# Patient Record
Sex: FEMALE | Race: BLACK OR AFRICAN AMERICAN
[De-identification: names, ages, dates, MRNs, and addresses within clinical notes are randomized per-mention and may not be internally consistent; named-entity substitution may affect disease eponyms.]

---

## 2019-05-15 ENCOUNTER — HOSPITAL ENCOUNTER (EMERGENCY)
Dept: HOSPITAL 53 - M ED | Age: 24
Discharge: HOME | End: 2019-05-15
Payer: COMMERCIAL

## 2019-05-15 VITALS — WEIGHT: 144.29 LBS | HEIGHT: 65 IN | BODY MASS INDEX: 24.04 KG/M2

## 2019-05-15 VITALS — DIASTOLIC BLOOD PRESSURE: 70 MMHG | SYSTOLIC BLOOD PRESSURE: 124 MMHG

## 2019-05-15 DIAGNOSIS — O26.891: Primary | ICD-10-CM

## 2019-05-15 DIAGNOSIS — Z79.899: ICD-10-CM

## 2019-05-15 DIAGNOSIS — O34.81: ICD-10-CM

## 2019-05-15 DIAGNOSIS — Z3A.08: ICD-10-CM

## 2019-05-15 LAB
B-HCG SERPL-ACNC: (no result) MIU/ML
BASOPHILS # BLD AUTO: 0 10^3/UL (ref 0–0.2)
BASOPHILS NFR BLD AUTO: 0.4 % (ref 0–1)
BUN SERPL-MCNC: 7 MG/DL (ref 7–18)
CALCIUM SERPL-MCNC: 8.6 MG/DL (ref 8.5–10.1)
CHLORIDE SERPL-SCNC: 105 MEQ/L (ref 98–107)
CO2 SERPL-SCNC: 25 MEQ/L (ref 21–32)
CREAT SERPL-MCNC: 0.79 MG/DL (ref 0.55–1.3)
EOSINOPHIL # BLD AUTO: 0.1 10^3/UL (ref 0–0.5)
EOSINOPHIL NFR BLD AUTO: 1.3 % (ref 0–3)
GFR SERPL CREATININE-BSD FRML MDRD: > 60 ML/MIN/{1.73_M2} (ref 60–?)
GLUCOSE SERPL-MCNC: 90 MG/DL (ref 70–100)
HCT VFR BLD AUTO: 40.6 % (ref 36–47)
HGB BLD-MCNC: 13.6 G/DL (ref 12–15.5)
LYMPHOCYTES # BLD AUTO: 1.9 10^3/UL (ref 1.5–6.5)
LYMPHOCYTES NFR BLD AUTO: 39.8 % (ref 24–44)
MCH RBC QN AUTO: 29.5 PG (ref 27–33)
MCHC RBC AUTO-ENTMCNC: 33.5 G/DL (ref 32–36.5)
MCV RBC AUTO: 88.1 FL (ref 80–96)
MONOCYTES # BLD AUTO: 0.3 10^3/UL (ref 0–0.8)
MONOCYTES NFR BLD AUTO: 5.6 % (ref 0–5)
NEUTROPHILS # BLD AUTO: 2.5 10^3/UL (ref 1.8–7.7)
NEUTROPHILS NFR BLD AUTO: 52.7 % (ref 36–66)
PLATELET # BLD AUTO: 221 10^3/UL (ref 150–450)
POTASSIUM SERPL-SCNC: 3.7 MEQ/L (ref 3.5–5.1)
RBC # BLD AUTO: 4.61 10^6/UL (ref 4–5.4)
SODIUM SERPL-SCNC: 138 MEQ/L (ref 136–145)
WBC # BLD AUTO: 4.8 10^3/UL (ref 4–10)

## 2019-05-15 NOTE — REP
First trimester ultrasound for vaginal bleeding, stat request:

The study is performed with transabdominal, endovaginal and Doppler ultrasound

assessment.

There is an intrauterine gestational sac with a fetal pole.

The fetal heart rate is 160 beats per minute.

The fetal pole crown-rump length is 18.0 mm.

This corresponds to a gestational age of 8 weeks 2 days/[place] 12/23/2019.  The

There is no subchorionic hematoma.

There is a large right adnexal cyst measuring 6.6 x 4.0 x 6.3 cm., likely a

corpus luteum.

The right ovary measures 3.1 x 1.6 x 2.7 cm excluding in the large cyst.

There is vascular flow in the right ovary with the Doppler resistive index of the

parenchymal arteries measuring 0.51.

There is a trace of free fluid in the right adnexa.

The left ovary could not be identified.

 

 

 

Electronically Signed by

Antonio Odonnell MD 05/15/2019 08:49 A

## 2019-07-08 ENCOUNTER — HOSPITAL ENCOUNTER (EMERGENCY)
Dept: HOSPITAL 53 - M ED | Age: 24
Discharge: HOME | End: 2019-07-08
Payer: COMMERCIAL

## 2019-07-08 VITALS — HEIGHT: 65 IN | BODY MASS INDEX: 24.04 KG/M2 | WEIGHT: 144.29 LBS

## 2019-07-08 VITALS — SYSTOLIC BLOOD PRESSURE: 110 MMHG | DIASTOLIC BLOOD PRESSURE: 61 MMHG

## 2019-07-08 DIAGNOSIS — O26.812: Primary | ICD-10-CM

## 2019-07-08 DIAGNOSIS — O21.1: ICD-10-CM
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
## 2019-07-08 DIAGNOSIS — Z79.82: ICD-10-CM

## 2019-07-08 DIAGNOSIS — Z3A.16: ICD-10-CM

## 2019-07-08 LAB
ALBUMIN SERPL BCG-MCNC: 2.9 GM/DL (ref 3.2–5.2)
ALT SERPL W P-5'-P-CCNC: 13 U/L (ref 12–78)
BASOPHILS # BLD AUTO: 0 10^3/UL (ref 0–0.2)
BASOPHILS NFR BLD AUTO: 0.1 % (ref 0–1)
BILIRUB SERPL-MCNC: 0.5 MG/DL (ref 0.2–1)
BUN SERPL-MCNC: 6 MG/DL (ref 7–18)
CALCIUM SERPL-MCNC: 8.2 MG/DL (ref 8.5–10.1)
CHLORIDE SERPL-SCNC: 108 MEQ/L (ref 98–107)
CO2 SERPL-SCNC: 25 MEQ/L (ref 21–32)
CREAT SERPL-MCNC: 0.68 MG/DL (ref 0.55–1.3)
EOSINOPHIL # BLD AUTO: 0.1 10^3/UL (ref 0–0.5)
EOSINOPHIL NFR BLD AUTO: 1.2 % (ref 0–3)
GFR SERPL CREATININE-BSD FRML MDRD: > 60 ML/MIN/{1.73_M2} (ref 60–?)
GLUCOSE SERPL-MCNC: 80 MG/DL (ref 70–100)
HCT VFR BLD AUTO: 38.2 % (ref 36–47)
HGB BLD-MCNC: 13.1 G/DL (ref 12–15.5)
LYMPHOCYTES # BLD AUTO: 1.9 10^3/UL (ref 1.5–6.5)
LYMPHOCYTES NFR BLD AUTO: 26 % (ref 24–44)
MCH RBC QN AUTO: 30 PG (ref 27–33)
MCHC RBC AUTO-ENTMCNC: 34.3 G/DL (ref 32–36.5)
MCV RBC AUTO: 87.6 FL (ref 80–96)
MONOCYTES # BLD AUTO: 0.3 10^3/UL (ref 0–0.8)
MONOCYTES NFR BLD AUTO: 4.7 % (ref 0–5)
NEUTROPHILS # BLD AUTO: 4.9 10^3/UL (ref 1.8–7.7)
NEUTROPHILS NFR BLD AUTO: 67.6 % (ref 36–66)
PLATELET # BLD AUTO: 199 10^3/UL (ref 150–450)
POTASSIUM SERPL-SCNC: 3.6 MEQ/L (ref 3.5–5.1)
PROT SERPL-MCNC: 6.8 GM/DL (ref 6.4–8.2)
RBC # BLD AUTO: 4.36 10^6/UL (ref 4–5.4)
SODIUM SERPL-SCNC: 140 MEQ/L (ref 136–145)
WBC # BLD AUTO: 7.3 10^3/UL (ref 4–10)

## 2019-07-08 PROCEDURE — 81001 URINALYSIS AUTO W/SCOPE: CPT

## 2019-07-08 PROCEDURE — 99284 EMERGENCY DEPT VISIT MOD MDM: CPT

## 2019-07-08 PROCEDURE — 96374 THER/PROPH/DIAG INJ IV PUSH: CPT

## 2019-07-08 PROCEDURE — 80053 COMPREHEN METABOLIC PANEL: CPT

## 2019-07-08 PROCEDURE — 96361 HYDRATE IV INFUSION ADD-ON: CPT

## 2019-07-08 PROCEDURE — 85025 COMPLETE CBC W/AUTO DIFF WBC: CPT

## 2019-08-06 ENCOUNTER — HOSPITAL ENCOUNTER (OUTPATIENT)
Dept: HOSPITAL 53 - M LDO | Age: 24
Discharge: HOME | End: 2019-08-06
Payer: COMMERCIAL

## 2019-08-06 VITALS — HEIGHT: 65 IN | WEIGHT: 156.97 LBS | BODY MASS INDEX: 26.15 KG/M2

## 2019-08-06 VITALS — SYSTOLIC BLOOD PRESSURE: 127 MMHG | DIASTOLIC BLOOD PRESSURE: 69 MMHG

## 2019-08-06 VITALS — DIASTOLIC BLOOD PRESSURE: 66 MMHG | SYSTOLIC BLOOD PRESSURE: 112 MMHG

## 2019-08-06 DIAGNOSIS — R42: ICD-10-CM

## 2019-08-06 DIAGNOSIS — O26.892: Primary | ICD-10-CM

## 2019-08-06 DIAGNOSIS — Z3A.20: ICD-10-CM

## 2019-11-13 ENCOUNTER — HOSPITAL ENCOUNTER (OUTPATIENT)
Dept: HOSPITAL 53 - M LDO | Age: 24
Discharge: HOME | End: 2019-11-13
Attending: ADVANCED PRACTICE MIDWIFE
Payer: COMMERCIAL

## 2019-11-13 VITALS — WEIGHT: 183.65 LBS | HEIGHT: 66 IN | BODY MASS INDEX: 29.51 KG/M2

## 2019-11-13 VITALS — DIASTOLIC BLOOD PRESSURE: 58 MMHG | SYSTOLIC BLOOD PRESSURE: 110 MMHG

## 2019-11-13 DIAGNOSIS — Z3A.34: ICD-10-CM

## 2019-11-13 DIAGNOSIS — O26.893: Primary | ICD-10-CM

## 2019-11-13 DIAGNOSIS — N89.8: ICD-10-CM

## 2019-11-13 LAB
APPEARANCE UR: CLEAR
BACTERIA UR QL AUTO: NEGATIVE
BILIRUB UR QL STRIP.AUTO: NEGATIVE
GLUCOSE UR QL STRIP.AUTO: NEGATIVE MG/DL
HGB UR QL STRIP.AUTO: NEGATIVE
KETONES UR QL STRIP.AUTO: NEGATIVE MG/DL
LEUKOCYTE ESTERASE UR QL STRIP.AUTO: (no result)
MUCOUS THREADS URNS QL MICRO: (no result)
NITRITE UR QL STRIP.AUTO: NEGATIVE
PH UR STRIP.AUTO: 7 UNITS (ref 5–9)
PROT UR QL STRIP.AUTO: NEGATIVE MG/DL
RBC # UR AUTO: 1 /HPF (ref 0–3)
SP GR UR STRIP.AUTO: 1.03 (ref 1–1.03)
SQUAMOUS #/AREA URNS AUTO: 4 /HPF (ref 0–6)
UROBILINOGEN UR QL STRIP.AUTO: 2 MG/DL (ref 0–2)
WBC #/AREA URNS AUTO: 2 /HPF (ref 0–3)

## 2019-11-13 PROCEDURE — 87086 URINE CULTURE/COLONY COUNT: CPT

## 2019-11-13 PROCEDURE — 81001 URINALYSIS AUTO W/SCOPE: CPT

## 2019-11-13 PROCEDURE — 59025 FETAL NON-STRESS TEST: CPT

## 2019-11-13 NOTE — IPNPDOC
Obstetrical Progress Note


Date of Service


2019





Subjective


Ms. Marrufo is a 25yo  at 34+2wks who presents to triage with c/o leaking

fluid x3 weeks. Pt reports +FM, denies LOF/VB/CTX. She also c/o frequent desire 

to void, but with little flow; denies dysuria; she also denies recent 

intercourse (>2 months), denies abnormal discharge. 





Ms. Marrufo states that she leaks so much fluid that she frequently soaks a pad 

and could fill up a cup with the amount of fluid leaking. Despite having two 

visits in the clinic over the past three weeks, she has not reported this to her

primary OB provider.





Objective


O: VSS


, moderate variability, + accels, no decels noted


CTX: None, abdomen soft by palpation


ANGELO: Single deepest pocket 4.21; Confirmed Vertex presentation


SSE: No fluid pooling, copius yeast adhered to vaginal walls; cervix appears 

closed


Wet Prep: + yeast buds, no clue cells


Urine sent to lab for UA/UC, was dark yellow (c/w dehydration)








Vital Signs








  Date Time  Temp Pulse Resp B/P (MAP) Pulse Ox O2 Delivery O2 Flow Rate FiO2


 


19 16:09 98.7 86 18 110/58 (75) 98 Room Air  











Assessment and Plan


Fetal Status:  Reassuring


Group B Streptococcus:  Unknown


Additional Comments


A: 25yo  at 34+2wks, Intact membranes, Reactive NST, + yeast; dehydration 

(pt PO hydrated in triage)





P: Pt discharged home with  precautions


Will need to  Rx for diflucan from [facility] on  tomorrow


Encouraged pt to hydrate adequately (3-4 liters/day), reduce sugary drinks


Will treat for UTI if culture is positive


f/u next week for schedule appointment or sooner PRN











NILSON DUNCAN CNM          2019 18:07

## 2019-12-16 ENCOUNTER — HOSPITAL ENCOUNTER (INPATIENT)
Dept: HOSPITAL 92 - L&D | Age: 24
LOS: 2 days | Discharge: HOME | End: 2019-12-18
Attending: OBSTETRICS & GYNECOLOGY | Admitting: OBSTETRICS & GYNECOLOGY
Payer: COMMERCIAL

## 2019-12-16 VITALS — BODY MASS INDEX: 30.7 KG/M2

## 2019-12-16 DIAGNOSIS — Z3A.39: ICD-10-CM

## 2019-12-16 LAB
HBSAG INDEX: 0.12 S/CO (ref 0–0.99)
HGB BLD-MCNC: 10.8 G/DL (ref 12–16)
MCH RBC QN AUTO: 29.5 PG (ref 27–31)
MCV RBC AUTO: 86.9 FL (ref 78–98)
PLATELET # BLD AUTO: 131 THOU/UL (ref 130–400)
RBC # BLD AUTO: 3.64 MILL/UL (ref 4.2–5.4)
SYPHILIS ANTIBODY INDEX: 0.09 S/CO
WBC # BLD AUTO: 6 THOU/UL (ref 4.8–10.8)

## 2019-12-16 PROCEDURE — 86850 RBC ANTIBODY SCREEN: CPT

## 2019-12-16 PROCEDURE — 36415 COLL VENOUS BLD VENIPUNCTURE: CPT

## 2019-12-16 PROCEDURE — 86900 BLOOD TYPING SEROLOGIC ABO: CPT

## 2019-12-16 PROCEDURE — 86901 BLOOD TYPING SEROLOGIC RH(D): CPT

## 2019-12-16 PROCEDURE — 86780 TREPONEMA PALLIDUM: CPT

## 2019-12-16 PROCEDURE — 87340 HEPATITIS B SURFACE AG IA: CPT

## 2019-12-16 PROCEDURE — 85027 COMPLETE CBC AUTOMATED: CPT

## 2019-12-16 RX ADMIN — SODIUM CHLORIDE SCH: 0.9 INJECTION, SOLUTION INTRAVENOUS at 15:09

## 2019-12-16 RX ADMIN — Medication SCH MLS: at 14:54

## 2019-12-16 RX ADMIN — Medication SCH: at 19:21

## 2019-12-16 RX ADMIN — Medication SCH MLS: at 20:34

## 2019-12-16 RX ADMIN — Medication SCH: at 15:09

## 2019-12-16 NOTE — PDOC.LDHP
Labor and Delivery H&P


HPI: 


23 y/o  at 39 and 0/7 weeks for term induction of labor.





Current gestational age (weeks): 39


Due date: 19


Grav: 2


Para: 1


Current pregnancy complications: none


Current medications: pre-tatiana vitamins


Previous surgical history: none


Allergies/Adverse Reactions: 


 Allergies











Allergy/AdvReac Type Severity Reaction Status Date / Time


 


No Known Allergies Allergy   Verified 13 18:58











Social history: none





- Physical Exam


Vital signs reviewed and normal: yes


General: NAD, resting


Heart: RRR


Lungs: CTAB


Abdomen: gravid


Extremeties: no edema





- Assessment


L&D Assessment: elective induction at term





- Plan


Plan: admit to L&D, cervical ripening

## 2019-12-17 RX ADMIN — Medication SCH MLS: at 08:47

## 2019-12-17 RX ADMIN — SODIUM CHLORIDE SCH: 0.9 INJECTION, SOLUTION INTRAVENOUS at 19:37

## 2019-12-17 RX ADMIN — DOCUSATE CALCIUM SCH: 240 CAPSULE, LIQUID FILLED ORAL at 21:12

## 2019-12-17 RX ADMIN — Medication SCH MLS: at 00:10

## 2019-12-17 RX ADMIN — Medication SCH MLS: at 04:01

## 2019-12-17 RX ADMIN — Medication SCH: at 19:37

## 2019-12-17 RX ADMIN — HYDROCODONE BITARTRATE AND ACETAMINOPHEN PRN TAB: 5; 325 TABLET ORAL at 18:47

## 2019-12-18 VITALS — TEMPERATURE: 98.1 F

## 2019-12-18 VITALS — SYSTOLIC BLOOD PRESSURE: 123 MMHG | DIASTOLIC BLOOD PRESSURE: 73 MMHG

## 2019-12-18 LAB
HGB BLD-MCNC: 10.6 G/DL (ref 12–16)
MCH RBC QN AUTO: 30.2 PG (ref 27–31)
MCV RBC AUTO: 88.8 FL (ref 78–98)
PLATELET # BLD AUTO: 143 THOU/UL (ref 130–400)
RBC # BLD AUTO: 3.51 MILL/UL (ref 4.2–5.4)
WBC # BLD AUTO: 8.8 THOU/UL (ref 4.8–10.8)

## 2019-12-18 RX ADMIN — DOCUSATE CALCIUM SCH: 240 CAPSULE, LIQUID FILLED ORAL at 08:43

## 2019-12-18 RX ADMIN — HYDROCODONE BITARTRATE AND ACETAMINOPHEN PRN TAB: 5; 325 TABLET ORAL at 04:27

## 2019-12-18 NOTE — PDOC.PP
Post Partum Progress Note


Post Partum Day #: 1


PO intake tolerated: yes


Flatus: yes


Ambulation: yes


 Vital Signs (12 hours)











  Temp Pulse Resp BP Pulse Ox


 


 12/18/19 12:52  98.1 F  59 L  19  123/73  99


 


 12/18/19 08:43  97.7 F  58 L  20  118/69  98








 Weight











Weight                         190 lb

















- Physical Examination


General: NAD


Cardiovascular: no m/r/g, RRR


Respiratory: clear to auscultation bilaterally, non-labored breathing


Abdominal: + bowel sounds, lochia, no distention, appropriately TTP


Extremities: negative homans (B)


Neurological: no gross focal deficits


Psychiatric: A&Ox3, normal affect


Result Diagrams: 


 12/18/19 04:31





Additional Labs: 


 Post Partum Labs











Blood Type  O POSITIVE   12/16/19  11:07    


 


Hep Bs Antigen  Non-Reactive S/CO (NonReactive)   12/16/19  10:32